# Patient Record
Sex: FEMALE | Race: WHITE
[De-identification: names, ages, dates, MRNs, and addresses within clinical notes are randomized per-mention and may not be internally consistent; named-entity substitution may affect disease eponyms.]

---

## 2017-08-30 ENCOUNTER — HOSPITAL ENCOUNTER (OUTPATIENT)
Dept: HOSPITAL 45 - C.LAB | Age: 57
Discharge: HOME | End: 2017-08-30
Attending: CHIROPRACTOR
Payer: COMMERCIAL

## 2017-08-30 DIAGNOSIS — M79.1: Primary | ICD-10-CM

## 2017-08-30 LAB
ALBUMIN/GLOB SERPL: 0.9 {RATIO} (ref 0.9–2)
ALP SERPL-CCNC: 125 U/L (ref 45–117)
ALT SERPL-CCNC: 35 U/L (ref 12–78)
ANION GAP SERPL CALC-SCNC: 2 MMOL/L (ref 3–11)
AST SERPL-CCNC: 30 U/L (ref 15–37)
BASOPHILS # BLD: 0.05 K/UL (ref 0–0.2)
BASOPHILS NFR BLD: 0.6 %
BUN SERPL-MCNC: 16 MG/DL (ref 7–18)
BUN/CREAT SERPL: 20.3 (ref 10–20)
CALCIUM SERPL-MCNC: 9.3 MG/DL (ref 8.5–10.1)
CHLORIDE SERPL-SCNC: 107 MMOL/L (ref 98–107)
CO2 SERPL-SCNC: 31 MMOL/L (ref 21–32)
COMPLETE: YES
CREAT SERPL-MCNC: 0.78 MG/DL (ref 0.6–1.2)
CRP SERPL-MCNC: < 0.29 MG/DL (ref 0–0.29)
EOSINOPHIL NFR BLD AUTO: 281 K/UL (ref 130–400)
GLOBULIN SER-MCNC: 3.8 GM/DL (ref 2.5–4)
GLUCOSE SERPL-MCNC: 81 MG/DL (ref 70–99)
HCT VFR BLD CALC: 42.6 % (ref 37–47)
IG%: 0.2 %
IMM GRANULOCYTES NFR BLD AUTO: 41 %
LYMPHOCYTES # BLD: 3.52 K/UL (ref 1.2–3.4)
MCH RBC QN AUTO: 31.1 PG (ref 25–34)
MCHC RBC AUTO-ENTMCNC: 34.3 G/DL (ref 32–36)
MCV RBC AUTO: 90.6 FL (ref 80–100)
MONOCYTES NFR BLD: 6.4 %
NEUTROPHILS # BLD AUTO: 1.6 %
NEUTROPHILS NFR BLD AUTO: 50.2 %
PMV BLD AUTO: 9.6 FL (ref 7.4–10.4)
POTASSIUM SERPL-SCNC: 4.1 MMOL/L (ref 3.5–5.1)
RBC # BLD AUTO: 4.7 M/UL (ref 4.2–5.4)
SODIUM SERPL-SCNC: 140 MMOL/L (ref 136–145)
TSH SERPL-ACNC: 1.5 UIU/ML (ref 0.3–4.5)
WBC # BLD AUTO: 8.58 K/UL (ref 4.8–10.8)

## 2017-08-31 LAB — EST. AVERAGE GLUCOSE BLD GHB EST-MCNC: 128 MG/DL

## 2017-09-03 LAB
T3FREE SERPL-SCNC: 15 NG/DL (ref 8–25)
THYROGLOB AB SERPL-ACNC: <1 IU/ML
THYROPEROXIDASE AB SERPL-ACNC: 16 IU/ML (ref ?–9)

## 2017-12-11 ENCOUNTER — HOSPITAL ENCOUNTER (EMERGENCY)
Dept: HOSPITAL 45 - C.EDB | Age: 57
Discharge: HOME | End: 2017-12-11
Payer: COMMERCIAL

## 2017-12-11 VITALS
WEIGHT: 198.2 LBS | HEIGHT: 67.99 IN | WEIGHT: 198.2 LBS | BODY MASS INDEX: 30.04 KG/M2 | HEIGHT: 67.99 IN | BODY MASS INDEX: 30.04 KG/M2

## 2017-12-11 VITALS — TEMPERATURE: 98.06 F

## 2017-12-11 VITALS — DIASTOLIC BLOOD PRESSURE: 83 MMHG | OXYGEN SATURATION: 95 % | HEART RATE: 64 BPM | SYSTOLIC BLOOD PRESSURE: 128 MMHG

## 2017-12-11 DIAGNOSIS — T14.8XXA: ICD-10-CM
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
## 2017-12-11 DIAGNOSIS — S62.617A: Primary | ICD-10-CM

## 2017-12-11 NOTE — DIAGNOSTIC IMAGING REPORT
LEFT WRIST 5 VIEWS



CLINICAL HISTORY: Fall with left wrist pain.



FINDINGS: 5 views of left wrist are compared to study dated 4/9/2014. The

skeletal structures are osteopenic. There is no radiographic evidence of left

wrist fracture. The joint spaces of the wrist appear maintained. There is

distracted and angulated fracture through the base of the fifth proximal phalanx

which is partially imaged. Overlying soft tissue edema is noted.



IMPRESSION:



1. There is no radiographic evidence of left wrist fracture.



2. A distracted and angulated fracture at the base of the fifth proximal phalanx

is partially visualized.







Electronically signed by:  Reggie Crenshaw M.D.

12/11/2017 8:13 PM



Dictated Date/Time:  12/11/2017 8:12 PM

## 2017-12-11 NOTE — DIAGNOSTIC IMAGING REPORT
LEFT HAND 3 VIEWS



CLINICAL HISTORY: Fall with left hand injury.



FINDINGS: 3 views of left hand are compared to study dated 4/9/2014. The

skeletal structures are osteopenic. There is a distracted and angulated fracture

through the base of the fifth proximal phalanx with overlying soft tissue edema.

No additional acute fracture is identified. A ring is present on the fourth

finger. Mild arthritic change is noted involving the interphalangeal joints.



IMPRESSION: There is a distracted and angulated fracture through the base of the

fifth proximal phalanx as above.







Electronically signed by:  Reggie Crenshaw M.D.

12/11/2017 8:04 PM



Dictated Date/Time:  12/11/2017 8:02 PM

## 2017-12-11 NOTE — EMERGENCY ROOM VISIT NOTE
History


First contact with patient:  19:21


Chief Complaint:  FALL


Stated Complaint:  FALL, HURT L ARM FACE INJURIES, BILAT KNEES





History of Present Illness


The patient is a 57 year old female who presents to the Emergency Room with 

complaints of a fall.  The patient states that she tripped over her feet and 

fell, landing onto her hands and knees.  She states that she did scrape her 

face off of concrete when trying to stand up.  She denies any headache or 

significant head injury.  There is no loss of consciousness.  She has abrasions 

to both hands and both feet and complains of mild pain in these locations.  She 

complains of pain in the left fifth finger and left wrist.  She does report she 

has had some left wrist pain for several weeks which seems to have been 

exacerbated by this fall.  She rates her discomfort a 5/10.  She did not take 

any medication for pain.  She denies any numbness or weakness.





Review of Systems


A 6 point review of systems was reviewed with the patient with pertinent 

positives and negatives as per history of present illness. All else were 

negative.





Social History


Smoking Status:  Never Smoker


Housing Status:  lives with family


Occupation Status:  employed





Current/Historical Medications


No Active Prescriptions or Reported Meds





Physical Exam


Vital Signs











  Date Time  Temp Pulse Resp B/P (MAP) Pulse Ox O2 Delivery O2 Flow Rate FiO2


 


12/11/17 20:59  64 12 128/83 95 Room Air  


 


12/11/17 19:15 36.7 81 20 170/105 98 Room Air  











Physical Exam


VITALS: Vitals are noted on the nurse's note and reviewed by myself.  Vital 

signs stable.


GENERAL: This is a 57-year-old female, in no acute distress, nondiaphoretic, 

well-developed well-nourished.


SKIN: There are several superficial abrasions to the face.  There are abrasions 

with a small skin tear to the left fifth finger.  There are several very 

superficial abrasions to bilateral knees with no active bleeding.


HEAD: Normocephalic atraumatic.  


EARS: External auditory canals clear, tympanic membranes pearly gray without 

erythema or effusion bilaterally.  No hemotympanum.


EYES: Pupils equal round and reactive to light and accommodation.  Extraocular 

movements intact.  


MOUTH: Mucous membranes moist.  


NECK: Supple without nuchal rigidity.  Cervical spine is nontender.  


MUSCULOSKELETAL: There is tenderness to palpation of the distal left wrist.  No 

deformity noted.  Full range of motion of the wrist.  There is tenderness to 

palpation of the ulnar aspect of the left hand and the left fifth finger.


NEURO: Patient was alert and oriented to person place and time.  Normal 

sensation to light and sharp touch.





Medical Decision & Procedures


ER Provider


Diagnostic Interpretation:


LEFT HAND 3 VIEWS





FINDINGS: 3 views of left hand are compared to study dated 4/9/2014. The


skeletal structures are osteopenic. There is a distracted and angulated fracture


through the base of the fifth proximal phalanx with overlying soft tissue edema.


No additional acute fracture is identified. A ring is present on the fourth


finger. Mild arthritic change is noted involving the interphalangeal joints.





IMPRESSION: There is a distracted and angulated fracture through the base of the


fifth proximal phalanx as above.








LEFT WRIST 5 VIEWS





FINDINGS: 5 views of left wrist are compared to study dated 4/9/2014. The


skeletal structures are osteopenic. There is no radiographic evidence of left


wrist fracture. The joint spaces of the wrist appear maintained. There is


distracted and angulated fracture through the base of the fifth proximal phalanx


which is partially imaged. Overlying soft tissue edema is noted.





IMPRESSION:





1. There is no radiographic evidence of left wrist fracture.





2. A distracted and angulated fracture at the base of the fifth proximal phalanx


is partially visualized.





Medical Decision


The patient was evaluated as above.  She did not appear to sustain a 

significant head injury.  Most of the pain is in the wrist and hand.  X-rays of 

the left wrist and hand were obtained and read by radiology.  She does have a 

fracture of the proximal phalanx of the left fifth finger.  On exam, the 

patient does have an abrasion overlying this area.  The wound was cleaned and 

does not appear to be deep.  There is no evidence of an open fracture.  I did 

consider placing the patient in an Ortho-Glass ulnar gutter splint for stability

, however at this time I think she would benefit more from a removable metal 

splint so she is able to clean and dress the superficial wounds of the finger.  

He was given information for orthopedic follow-up.  Conservative measures were 

discussed.  She declined analgesics.  The patient verbalized understanding of 

my assessment and treatment plan and was discharged home in good condition.





Medication Reconcilliation


Current Medication List:  was personally reviewed by me





Blood Pressure Screening


Patient's blood pressure:  Normal blood pressure





Impression





 Primary Impression:  


 Finger fracture, left


 Additional Impressions:  


 Contusion of multiple sites


 Abrasions of multiple sites





Departure Information


Dispostion


Home / Self-Care





Condition


GOOD





Prescriptions





No Active Prescriptions or Reported Meds





Referrals


No Doctor, Assigned (PCP)








Be Cuevas MD





Patient Instructions


My [facility]





Additional Instructions





With a finger splint until you're able to follow-up with orthopedics.  Call 

tomorrow for this appointment.  You may remove the splint to clean and dress 

the wounds.





Proper wound care is essential for adequate wound healing and infection 

prevention. You can shower and clean the wound with soap and water. Do not 

scour over the wound, pat dry with a towel. Do not submerse the wound (i.e. 

bathe or dish wash) until the wound has fully healed. You can use an antibiotic 

ointment with a dressing over the wound for the next 3-4 days. After this time 

you may leave the wound dry and open to the air.





For pain control, you can use the following over-the-counter medicines (if >13 yo):





- Regular strength (325mg/tab) Tylenol (acetaminophen) 2 tabs every 4-6 hours 

as needed. Do not exceed 12 tablets in a 24 hour period. Avoid taking more than 

4 grams (4000 mg) of Tylenol per day. This includes any other sources of 

acetaminophen you may take on a regular basis.





- Regular strength (200 mg/tab) Advil (ibuprofen) 1-2 tabs every 4-6 hours as 

needed. Do not exceed a dose of 3200 mg per day.





Apply ice to the finger.





Return to the emergency department with any worsening or new/concerning 

symptoms.





Problem Qualifiers








 Primary Impression:  


 Finger fracture, left


 Encounter type:  initial encounter  Finger:  little finger  Fracture type:  

closed  Phalanx:  proximal  Fracture alignment:  displaced  Qualified Codes:  

S62.617A - Displaced fracture of proximal phalanx of left little finger, 

initial encounter for closed fracture

## 2017-12-13 NOTE — HISTORY AND PHYSICAL
History & Physical


Date & Time of Service:


Dec 13, 2017 at 11:07


Chief Complaint:


Left Pinky Proximal Phalanx Fracture


Primary Care Physician:


No Doctor, Assigned


History of Present Illness


Source:  patient


Patient is a 57-year-old female who is status post a fall outside of a store on 

Monday, December 11, 2017.  She states that she was not carrying anything and 

tripped over some uneven sidewalk.  She fell forward and put both of her hands 

up to [place] self.  She denies any other injuries besides some facial 

abrasions.  She did not lose consciousness.  She had immediate left small 

finger pain went to the emergency room where x-rays were taken.  She was found 

to have a left fifth proximal phalanx fracture, which was splinted.  She was 

referred to our office for orthopedic evaluation.  She has been keeping her 

finger in the splint but removing it to bathe.  She also did obtain a 

laceration of her left hand.  She denies any lightheadedness or dizziness.  She 

denies any numbness or tingling of her left hand.  She denies any previous 

issues with her left hand.  Conservative treatment versus surgical intervention 

options were discussed.  She wished to proceed with surgery.  She is scheduled 

for a left small finger proximal phalanx fracture closed reduction percutaneous 

pinning with Dr. Be Cuevas on December 14, 2017 at the [facility] 

surgery [place].





Past Medical/Surgical History


1.  History of heart murmur


2.  Overweight


3.  History of tubal ligation





Family History


Noncontributory





Social History


Smoking Status:  Never Smoker


Alcohol Use:  none


Drug Use:  none


Marital Status:  


Housing status:  lives with significant other


Occupational Status:  employed





Multi-Drug Resistant Organisms


History of MDRO:  No





Allergies


Coded Allergies:  


     Penicillins (Verified  Allergy, Intermediate, swollen hands, itching, 12/11 /17)


     Sulfa Drugs (Verified  Allergy, Intermediate, BURNING, 12/11/17)





Home Medications


No Active Prescriptions or Reported Meds





Review of Systems


Constitutional:  No fever, No chills, No sweats, No weight loss, No fatigue


Eyes:  No worsening of vision, No redness


ENT:  No hearing loss, No sore throat, No tinnitus, No dental problems


Respiratory:  No cough, No sputum, No wheezing, No shortness of breath


Cardiovascular:  No chest pain, No edema, No palpitations


Abdomen:  No pain, No vomiting, No diarrhea, No constipation


Musculoskeletal:  + joint pain (left 5th finger), + swelling (left 4th and 5th 

fingers/left hand), No calf pain


Genitourinary - Female:  No dysuria, No urinary frequency, No urinary urgency


Neurologic:  No memory loss, No numbness/tingling, No balance problems


Integumentary:  No rash, No itch


Allergic / Immunologic:  No frequent infections, No poor healing





Physical Exam


General Appearance:  no apparent distress


Head:  normocephalic, atraumatic


Eyes:  normal inspection, PERRL, EOMI


ENT:  normal ENT inspection, hearing grossly normal, TMs normal, pharynx normal


Neck:  supple, no carotid bruits, trachea midline


Respiratory/Chest:  chest non-tender, lungs clear, normal breath sounds, no 

respiratory distress, no accessory muscle use


Cardiovascular:  regular rate, rhythm, no edema, no murmur, normal peripheral 

pulses


Abdomen/GI:  normal bowel sounds, non tender, soft


Extremities/Musculoskelatal:  no calf tenderness, normal capillary refill, no 

pedal edema, + pertinent finding (Left fifth finger and splint.  With edema of 

the  small finger and ring finger.  Her ring finger does have a wedding band on 

it.  There some small abrasions appreciated.  Dressings were not removed as 

they were just applied by Dr. Cuevas.  Distal sensation seems to be intact.

  Capillary refill is brisk.  Range of motion not attempted due to being in the 

splint.  Wrist moves well without discomfort.  Nontender throughout her forearm 

or elbow.)


Neurologic/Psych:  no motor/sensory deficits, alert, normal mood/affect, 

oriented x 3


Skin:  normal color, warm/dry, no rash





Diagnostics


Laboratory Results


LEFT HAND 3 VIEWS





CLINICAL HISTORY: Fall with left hand injury.





FINDINGS: 3 views of left hand are compared to study dated 4/9/2014. The


skeletal structures are osteopenic. There is a distracted and angulated fracture


through the base of the fifth proximal phalanx with overlying soft tissue edema.


No additional acute fracture is identified. A ring is present on the fourth


finger. Mild arthritic change is noted involving the interphalangeal joints.





IMPRESSION: There is a distracted and angulated fracture through the base of the


fifth proximal phalanx as above.





Impression


Assessment and Plan


Assessment: Left Small finger Proximal phalanx fracture





Plan: Patient is scheduled for a closed reduction percutaneous pinning of her 

left small finger proximal phalanx fracture with Dr. Be Cuevas on 

December 14, 2017 at the [facility].  Risks and complications 

were explained to the patient and included but not limited to infection, pain, 

scarring, bleeding, nerve and blood vessel damage, wound problems, stiffness, 

weakness, nonunion, malunion, hardware failure, blood clots, embolism, heart 

attack, stroke and death.She wished to proceed with surgery and informed 

consent was obtained by Dr. Cuevas.  She does not require any preoperative 

lab work, EKG, or medical clearance prior to surgery.  He was instructed to 

keep the splint on at all times, apply ice to her left hand, and keep elevated 

above her heart as needed for swelling.  She was instructed to attempt to 

remove the ring on her left ring finger which she states that her  can 

take care of rather than me trying here in the office.  Her surgery we will be 

performed with local and sedation.  She will follow up a week after surgery 

with Hossein Pinto PA-C for a dressing change and pin cleaning.  She will also 

follow up with Dr. Cuevas 10-14 days after surgery for dressing change and 

wound check as well.  She states that she did not want any pain medication 

today.  She has Tylenol and Advil that she will take at home.  She also has 

hydrocodone and Tylenol from a recent dental procedure at home that she did not 

take.  She states that she does not need any more scripts.  I gave her a note 

to be out of work for a minimum of 4 weeks due to her left hand injury and 

upcoming surgery.  All questions were answered today.  She knows to call with 

any further problems, questions, or concerns.

## 2017-12-14 ENCOUNTER — HOSPITAL ENCOUNTER (OUTPATIENT)
Dept: HOSPITAL 45 - X.SURG | Age: 57
Discharge: HOME | End: 2017-12-14
Attending: ORTHOPAEDIC SURGERY
Payer: COMMERCIAL

## 2017-12-14 VITALS — HEART RATE: 64 BPM | SYSTOLIC BLOOD PRESSURE: 154 MMHG | DIASTOLIC BLOOD PRESSURE: 91 MMHG | OXYGEN SATURATION: 98 %

## 2017-12-14 VITALS
BODY MASS INDEX: 29.46 KG/M2 | WEIGHT: 194.4 LBS | BODY MASS INDEX: 29.46 KG/M2 | HEIGHT: 67.99 IN | HEIGHT: 67.99 IN | WEIGHT: 194.4 LBS

## 2017-12-14 VITALS — TEMPERATURE: 98.06 F
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
## 2017-12-14 DIAGNOSIS — S62.617A: Primary | ICD-10-CM

## 2017-12-14 NOTE — DISCHARGE INSTRUCTIONS
Discharge Instructions


Date of Service


Dec 14, 2017.





Admission


Reason for Admission:  Left Pinky Proximal Phalanx Fracture





Discharge


Discharge Diagnosis / Problem:  Left pinky proximal phalanx fracture





Discharge Goals


Goal(s):  Decrease discomfort, Improve function, Increase independence





Activity Recommendations


Activity Limitations:  as noted below


Lifting Limitations:  until after follow-up appointment


Exercise/Sports Limitations:  until after follow-up appointment


May Resume Sexual Activity:  when tolerated


Shower/Bathe:  tomorrow, keep incision dry


Driving or Machine Use:  no driving until cleared by orthopedic surgeon


Weightbearing Status:  Left non-weightbearing (No lifting with Left UE)





.





Instructions / Follow-Up


Instructions / Follow-Up


 Post-operative Instructions





Dear Patient and Family/Friends,





Before you are discharged from the hospital, it is important to know what to 

expect when you get home after surgery.  To that end, we have created this 

sheet of discharge instructions which covers many commonly asked questions.  

Make sure you go through this sheet in its entirety with your nurse before you 

are discharged.  Please note that we will go over the specifics of your surgery 

and recovery when you return for your first post-operative visit.





Sincerely,


Dr. Cuevas





Pain


Expect to be in a fair amount of pain after surgery.  Remember, our goal is not 

to eliminate your pain, but to make it tolerable. It is a good idea to stay 

ahead of your pain by taking the medications you were prescribed once you get 

home.  Typically, the pain starts improving 3-7 days after surgery. You should 

start weaning off the narcotic pain medication (oxycodone, hydrocodone, 

hydromorphone, morphine) as soon as your pain improves. Please call our office 

if your pain is not adequately controlled.





Ice


Ice your operative site at least 5 times a day for 15-30 minutes at a time. 

Make sure you have a thin cloth between the ice or cooling unit and your skin 

to prevent frost bite.  This is especially important if you received a nerve 

block.  Continue icing your operative site for the first 5-7 days after surgery

, then as needed.





Diet/Nausea/Vomiting


Start by drinking clear liquids and eating crackers.  If you can tolerate this, 

then you may resume your normal diet. If you feel nauseated or vomit, take 

Zofran/ondansetron (if prescribed). Please call our office if you have 

intractable nausea or vomiting, or, if after hours, you may go to the Emergency 

Room for help.





Constipation


Constipation is a common side effect of narcotic pain medication.  If you have 

not had a bowel movement within 2 days after surgery, we recommend purchasing 

an over the counter laxative such as Milk of Magnesia, Dulcolax, or Miralax 

from a local pharmacy, and taking it as instructed.  Call our clinic if any 

questions.





Slings and Braces


If you were placed in a sling or brace, it must be worn at all times, including 

sleep.  You may remove your sling or brace for physical therapy, home exercises

, and showering.  The length of time you will be in your brace and range of 

motion restrictions depends on what surgery you had; these details will be 

reviewed at your first post-operative appointment.





Nerve block


The anesthesia team sometimes places a nerve block to help with post-operative 

pain control.  This results in significant numbness and inability to move the 

extremity.  The nerve block usually wears off in 8-12 hours, but sometimes can 

last up to 24 hours.  Please call our office if you are still unable to move 

your extremity after 24 hours, unless you received a pain pump to take home.  

Nerve blocks typically wear off quickly, so start taking pain medication as 

soon as you start feeling soreness near your surgical site.





Weight bearing and Range of Motion.


Do not bear any weight through your operative extremity immediately after 

surgery.  If you had upper extremity surgery, do not lift anything with that 

arm.  If you are in a knee brace, keep it locked in place until your follow-up. 

We will discuss your weight bearing, range of motion, and lifting restrictions 

in detail at your first post-operative appointment.


Continuous Passive Motion (CPM) Machine


If you were prescribed a CPM machine, it will start after your first post-

operative appointment, at which time we will give you instructions on the range 

of motion settings and duration of treatment





Physical therapy


You will be given a prescription for physical therapy or occupational therapy 

at your first post-operative appointment.  Typically, patients start therapy 

within 1 week of surgery





Wound care and showering


We will inspect your wound at your first post-operative visit, and may do a 

dressing change at that time. Most patients will be in a water-proof dressing 

that is removed 14 days after surgery. It is normal to see some dried blood on 

the dressing.  Do not remove your dressing, paper strips or sutures yourself 

unless you are given permission.


Showering is allowed the day after surgery.  Do not scrub or remove any 

dressings.  The wound should not be submerged underwater (i.e. in a bathtub or 

pool) until 4 weeks after surgery





HANNAH stockings


If you were given white stockings, these are to be worn at all times except to 

shower (on both legs) for the first 2 weeks after surgery.





Driving


You may not drive while taking narcotic pain medication or while in a cast, 

splint, sling or brace.  


You, the patient, need to make the final determination about when you are safe 

to drive, however, the earliest you may consider driving after surgery is below:


Hand/Wrist/Elbow Surgery: 3 days         


Shoulder Surgery:             2 weeks


Hip,/Knee/Ankle Surgery:   4 weeks      


Fracture repair:                6 weeks





Return to Work


Your return to work depends on what surgery was done and what type of work you 

do.  Please bring any paperwork your employer needs completed to your first post

-operative visit.  Also, bring a description of your job duties, as this helps 

us to understand what risks you may face at work.





Travel


Avoid long distance travel (greater than 1 hour) in airplanes and cars for the 

first 6 weeks after surgery.  If you must travel, you need to have a Doppler 

ultrasound done before you travel to rule out a blood clot in your legs. 





Follow-up


You should have a follow-up appointment already scheduled 1-2 days after 

surgery.  If not, please contact our office to make this appointment before you 

leave the hospital.





When to call the office


It is normal to have swelling and bruising in the limb that was operated on.  

This will improve with time.  It is also normal to have fevers for the first 2 

days after surgery.  Reasons you should call your doctor include: Uncontrolled 

pain; Nausea, vomiting, or constipation that does not improve with medication; 

Fevers over 101.5, chills, sweats; Drainage or bleeding from the wound; Foul 

odor; Spreading areas of redness; Any other concerns





Current Hospital Diet


Patient's current hospital diet:





Discharge Diet


Recommended Diet:  Regular Diet





Procedures


Procedures Performed:  


Closed reduction, percutaneous transarticular pinning of left pinky finger


proximal phalanx fracture





Pending Studies


Studies pending at discharge:  no





Medical Emergencies








.


Who to Call and When:





Medical Emergencies:  If at any time you feel your situation is an emergency, 

please call 911 immediately.





.





Non-Emergent Contact


Non-Emergency issues call your:  Primary Care Provider


Call Non-Emergent contact if:  you have a fever, temperature is above 101.5, 

your pain is not controlled, your pain is worsening, wound has increased 

drainage, you have any medication questions


.








"Provider Documentation" section prepared by Hossein Pinto.








.





VTE Core Measure


Inpt VTE Proph given/why not?:  Other Anticoagulation (Aspirin EC 81 mg)





[place] Drug Monitoring Program


Search Results:  patient reviewed within database, no issues identified, see 

additional documentation

## 2017-12-14 NOTE — [facility] POST OPERATIVE BRIEF NOTE
Immediate Operative Summary


Operative Date


Dec 14, 2017.





Pre-Operative Diagnosis





Displaced left pinky finger proximal phalanx fracture





Post-Operative Diagnosis





Same





Procedure(s) Performed





Closed reduction, percutaneous transarticular pinning of left pinky finger


proximal phalanx fracture





Surgeon


Mason





Assistant Surgeon(s)


Zac Sanchez MD and KYRA Pinto PA-C





Estimated Blood Loss


Minimal





Findings


Fracture reduced and stabilized with a 0.062 inch intramedullary K-wire placed 

transarticularly through the MCP joint





Fluids (cc crystalloids)


1000 cc





Specimens





None





Drains


None





Anesthesia


Local with MAC





Complication(s)


None





Disposition


Recovery Room / PACU

## 2017-12-14 NOTE — ANESTHESIA PROGRESS NT - [facility]
Anesthesia Post Op Note


Date & Time


Dec 14, 2017 at 15:08





Vital Signs


Pain Intensity:  0





Vital Signs Past 12 Hours








  Date Time  Temp Pulse Resp B/P (MAP) Pulse Ox O2 Delivery O2 Flow Rate FiO2


 


12/14/17 15:00  64 16 154/91 (112) 98 Room Air  


 


12/14/17 14:22 36.7 70 16 156/93 (114) 99 Room Air  


 


12/14/17 12:15 36.7 65 22 168/103 (124) 100 Room Air  











Notes


Mental Status:  alert / awake / arousable, participated in evaluation


Pt Amnestic to Procedure:  Yes


Nausea / Vomiting:  adequately controlled


Pain:  adequately controlled


Airway Patency, RR, SpO2:  stable & adequate


BP & HR:  stable & adequate


Hydration State:  stable & adequate


Anesthetic Complications:  no major complications apparent

## 2017-12-14 NOTE — [facility] OPERATIVE REPORT
Operative Report


Operative Date


Dec 14, 2017.





Pre-Operative Diagnosis





Displaced left pinky finger proximal phalanx fracture





Post-Operative Diagnosis





Same





Procedure(s) Performed





Closed reduction, percutaneous transarticular pinning of left pinky finger


proximal phalanx fracture





Surgeon


Mason





Assistant Surgeon(s)


Zac Sanchez MD and KYRA Pinto PA-C





Estimated Blood Loss


Minimal





Findings


none





Fluids (cc crystalloids)


1000 cc





Specimens





None





Complication(s)


None





Disposition


Recovery Room / PACU


I attest to the content of the Intraoperative Record and any orders documented 

therein.  Any exceptions are noted below.

## 2017-12-14 NOTE — OPERATIVE REPORT
DATE OF OPERATION:  12/14/2017

 

PREOPERATIVE DIAGNOSIS:  Displaced left pinky finger proximal phalanx

fracture.

 

POSTOPERATIVE DIAGNOSIS:  Same.

 

PROCEDURE:  Closed reduction percutaneous pinning left  displaced pinky

finger proximal phalanx fracture.

 

SURGEON:  Dr. Be Cuevas.  

 

ASSISTANT SURGEONS:  Zac Sanchez M.D. and UDAY Pinto PA-C.

 

IMPLANTS:  One 0.062 inch smooth K-wire.

 

SPECIMENS:  None.

 

COMPLICATIONS:  None.

 

ESTIMATED BLOOD LOSS:  Minimal.

 

IV FLUIDS:  1000 mL crystalloid.

 

INDICATIONS:  Ms. Urrutia is  a 57-year-old female who fell  3 days ago

sustaining a displaced left pinky finger proximal phalanx fracture.  This was

located very close to the MCP joint, which is a very difficult fracture to

hold close reduced without internal fixation.  I had a long discussion with

her about treatment options including conservative management with a closed

reduction and casting versus closed reduction and percutaneous pinning. 

After reviewing all the risks and benefits of surgery she elected to proceed.

  All questions were answered.  Informed consent was signed.

 

OPERATIVE FINDINGS:  The fracture was close reduced and stabilized with a

single intramedullary 0.062 inch K-wire placed transarticularly through the

metacarpal head of the fifth metacarpal across the MCP joint and into the

intramedullary canal.  This was with the MCP joint flexed 90 degrees.

 

DESCRIPTION OF THE OPERATION:  The patient was identified in the preoperative

holding area where surgical site was marked.  She was brought back to the

main operating room where she was placed on the operating room table and

intravenous sedation was administered.  After a timeout had been performed,

the left pinky finger was given a digital block using a total of 10 mL of

0.5% Marcaine and 2% lidocaine, both without epinephrine.  Part of this

solution was also injected under the dorsal aspect of the hand overlying the

fifth metacarpal where her pin was placed.  We then prepped and draped the

hand in the normal sterile fashion.  Once she was numb we tested her block

and called another timeout before making incision.  The fracture was then

close reduced using multiple fluoroscopic views to confirm our reduction.  A

combination of traction and manual manipulation of the fracture was required

to obtain a reduction and hold it with the MCP joint in flexion.  We then

localized our starting point for our K-wine on  multiple views.  The K-wire

was then poked through the skin, avoiding the extensor tendon.  It was then

drilled across the metacarpal head of the fifth metacarpal and into the

proximal phalanx of the pinky finger all the way down the intramedullary

canal until it abutted the dorsal cortex proximal to the PIP joint.  Multiple

fluoroscopic views were used to confirm the adequacy of reduction.  We then

carefully evaluated the fracture and I felt that supplemental fixation with

additional wires were not likely to improve the stability and would place her

at higher risk of a pin tract infection and therefore we elected to not place

any other wires.  The wire was then bent back at the skin and a Jurgan ball

was placed.  The wound was dressed with Xeroform, fluffs, sterile soft roll

and she was placed into a plaster ulnar gutter splint with the pinky and ring

fingers held in the intrinsic plus position.  Her IV sedation was then

lifted.  She was transferred to the hospital bed and transferred to recovery

room in stable condition.

 

POSTOPERATIVE COURSE:  The patient will be discharged home to the recovery

room.  She will follow up in our clinic next week for splint removal and

inspection of her wounds.  Will plan on 3 weeks with the K-wire in place. 

After 3 weeks we will remove the K-wire in clinic and she will start

occupational therapy.  She is instructed to not move her MCP joint since the

K-wire does cross the joint for risk of fracturing the wire.  No DVT

prophylaxis is indicated for this small upper extremity joint surgery in a

patient without  risk factors.

 

 

I attest to the content of the Intraoperative Record and any orders documented therein. Any exception
s are noted below.

## 2017-12-28 ENCOUNTER — HOSPITAL ENCOUNTER (OUTPATIENT)
Dept: HOSPITAL 45 - C.RDSM | Age: 57
Discharge: HOME | End: 2017-12-28
Attending: ORTHOPAEDIC SURGERY
Payer: COMMERCIAL

## 2017-12-28 DIAGNOSIS — Z87.81: Primary | ICD-10-CM

## 2018-01-05 ENCOUNTER — HOSPITAL ENCOUNTER (OUTPATIENT)
Dept: HOSPITAL 45 - C.RDSM | Age: 58
Discharge: HOME | End: 2018-01-05
Attending: ORTHOPAEDIC SURGERY
Payer: COMMERCIAL

## 2018-01-05 DIAGNOSIS — S69.90XA: Primary | ICD-10-CM
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
## 2018-01-05 DIAGNOSIS — Z87.81: ICD-10-CM

## 2018-01-19 ENCOUNTER — HOSPITAL ENCOUNTER (OUTPATIENT)
Dept: HOSPITAL 45 - C.RDSM | Age: 58
Discharge: HOME | End: 2018-01-19
Attending: ORTHOPAEDIC SURGERY
Payer: COMMERCIAL
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
## 2018-01-19 DIAGNOSIS — T14.8XXA: Primary | ICD-10-CM

## 2018-03-02 ENCOUNTER — HOSPITAL ENCOUNTER (OUTPATIENT)
Dept: HOSPITAL 45 - C.RDSM | Age: 58
Discharge: HOME | End: 2018-03-02
Attending: PHYSICIAN ASSISTANT
Payer: COMMERCIAL

## 2018-03-02 DIAGNOSIS — Z87.81: Primary | ICD-10-CM

## 2018-03-02 NOTE — DIAGNOSTIC IMAGING REPORT
L HAND MIN 3 VIEWS



CLINICAL HISTORY: LEFT HAND FX fracture



COMPARISON: 1/19/2018



DISCUSSION: Generalized degenerative change. Healing fracture base proximal

phalanx fifth finger. Improving soft tissue edema. Superimposed degenerative

changes throughout. Mild periarticular osteopenia.    



IMPRESSION: Healing fracture base proximal phalanx fifth finger. Alignment

remains anatomic. Generalized degenerative change.











The above report was generated using voice recognition software.  It may contain

grammatical, syntax or spelling errors.







Electronically signed by:  Bryson Mcelroy M.D.

3/2/2018 3:42 PM



Dictated Date/Time:  3/2/2018 3:41 PM